# Patient Record
Sex: MALE | Race: BLACK OR AFRICAN AMERICAN | NOT HISPANIC OR LATINO | Employment: FULL TIME | URBAN - METROPOLITAN AREA
[De-identification: names, ages, dates, MRNs, and addresses within clinical notes are randomized per-mention and may not be internally consistent; named-entity substitution may affect disease eponyms.]

---

## 2023-01-03 ENCOUNTER — TELEPHONE (OUTPATIENT)
Dept: GASTROENTEROLOGY | Facility: CLINIC | Age: 58
End: 2023-01-03
Payer: OTHER GOVERNMENT

## 2023-01-03 NOTE — TELEPHONE ENCOUNTER
Called and spoke with patient regarding his upcoming office visit with Dr. Sanon. I explained since he has been seen by Yariel DAWN recently that he would have to provide records and the appointment would have to be approved prior to scheduling. [facility] and he will call them to first to clarify.

## 2023-01-04 NOTE — TELEPHONE ENCOUNTER
I think the referral was sent to us in error from the [facility] because the referral was for a screening colonoscopy and not a office visit. The patient is current with [facility] and just had his colonoscopy approx 2 months ago.   We would not see the patient in office for a screening colonoscopy.   As noted below by Keely if the patient would like to switch care to our office he would need to request all GI records and submit them to our office for review to see if our providers are willing to take him on as a second opinion.     Message to Keely to call patient and cancel appointment.

## 2023-01-04 NOTE — TELEPHONE ENCOUNTER
Called patient and explained referral was sent to us for screening colonoscopy he had that done by Yariel DAWN a few months ago. [facility] and will clear the confusion up there. Appointment cancelled.